# Patient Record
Sex: FEMALE | Race: BLACK OR AFRICAN AMERICAN | NOT HISPANIC OR LATINO | Employment: FULL TIME | URBAN - METROPOLITAN AREA
[De-identification: names, ages, dates, MRNs, and addresses within clinical notes are randomized per-mention and may not be internally consistent; named-entity substitution may affect disease eponyms.]

---

## 2022-07-21 VITALS
TEMPERATURE: 97.1 F | HEIGHT: 64 IN | DIASTOLIC BLOOD PRESSURE: 127 MMHG | SYSTOLIC BLOOD PRESSURE: 196 MMHG | HEART RATE: 93 BPM | BODY MASS INDEX: 45.41 KG/M2 | WEIGHT: 266 LBS | OXYGEN SATURATION: 100 % | RESPIRATION RATE: 18 BRPM

## 2022-07-21 PROCEDURE — 250N000009 HC RX 250: Performed by: STUDENT IN AN ORGANIZED HEALTH CARE EDUCATION/TRAINING PROGRAM

## 2022-07-21 PROCEDURE — 12011 RPR F/E/E/N/L/M 2.5 CM/<: CPT

## 2022-07-21 PROCEDURE — 99283 EMERGENCY DEPT VISIT LOW MDM: CPT

## 2022-07-21 PROCEDURE — 250N000011 HC RX IP 250 OP 636: Performed by: STUDENT IN AN ORGANIZED HEALTH CARE EDUCATION/TRAINING PROGRAM

## 2022-07-21 RX ADMIN — EPINEPHRINE BITARTRATE 3 ML: 1 POWDER at 23:55

## 2022-07-22 ENCOUNTER — HOSPITAL ENCOUNTER (EMERGENCY)
Facility: CLINIC | Age: 42
Discharge: HOME | End: 2022-07-22
Attending: EMERGENCY MEDICINE | Admitting: EMERGENCY MEDICINE
Payer: COMMERCIAL

## 2022-07-22 DIAGNOSIS — S01.81XA FACIAL LACERATION, INITIAL ENCOUNTER: ICD-10-CM

## 2022-07-22 NOTE — ED PROVIDER NOTES
EMERGENCY DEPARTMENT ENCOUnter      NAME: Chilo Sears  AGE: 42 year old female  YOB: 1980  MRN: [id number]  EVALUATION DATE & TIME: 2022 12:24 AM    PCP: No primary care provider on file.    ED PROVIDER: Pedro Harper DO      Chief Complaint   Patient presents with     Facial Laceration         FINAL IMPRESSION:  1. Facial laceration, initial encounter          ED COURSE & MEDICAL DECISION MAKIN:32 AM I met with the patient, obtained history, performed an initial exam, and discussed options and plan for diagnostics and treatment here in the ED.   12:40 AM Performed laceration repair procedure on patient.   12:53 AM I discussed the plan for discharge with the patient, and patient is agreeable. We discussed supportive cares at home and reasons for return to the ER including new or worsening symptoms - all questions and concerns addressed. Patient to be discharged by RN.    The patient presented emergency department tonight after suffering a minor facial laceration.  Her laceration was repaired with 3 stitches which she tolerated well.  Plan will be for discharge home with outpatient follow-up for suture removal in 5 days.  She is comfortable with this plan.    At the conclusion of the encounter I discussed the results of all of the tests and the disposition. The questions were answered. The patient or family acknowledged understanding and was agreeable with the care plan.           =================================================================    HPI    Per chart review, patient's last tetanus vaccination was on 09/10/2019.       Chilo Sears is a 42 year old female with a pertinent history of DM 2, HTN, anxiety, depression, who presents to this ED by walk in for evaluation of facial laceration. Patient reports she was attempting to put up a picture frame when a part of it slipped and struck her face just prior to ED arrival. She endorses a gaping laceration in her philtrum. She  "states ED nurses put LET on it ~30 minutes ago. Patient denies any additional complaints at this time.       REVIEW OF SYSTEMS     Constitutional:  Denies fever or chills  HENT:  Denies sore throat   Respiratory:  Denies cough or shortness of breath   Cardiovascular:  Denies chest pain or palpitations  GI:  Denies abdominal pain, nausea, or vomiting  Musculoskeletal:  Denies any new extremity pain   Skin:  Denies rash. Endorses wound.   Neurologic:  Denies headache, focal weakness or sensory changes    All other systems reviewed and are negative      PAST MEDICAL HISTORY:  History reviewed. No pertinent past medical history.    PAST SURGICAL HISTORY:  History reviewed. No pertinent surgical history.        CURRENT MEDICATIONS:    No current outpatient medications on file.      ALLERGIES:  No Known Allergies    FAMILY HISTORY:  History reviewed. No pertinent family history.    SOCIAL HISTORY:   Social History     Socioeconomic History     Marital status: Single     Spouse name: None     Number of children: None     Years of education: None     Highest education level: None       VITALS:  Patient Vitals for the past 24 hrs:   BP Temp Temp src Pulse Resp SpO2 Height Weight   07/21/22 2311 (!) 196/127 97.1  F (36.2  C) Temporal 93 18 100 % 1.626 m (5' 4\") 120.7 kg (266 lb)       PHYSICAL EXAM    Constitutional:  Well developed, Well nourished,  HENT:  Normocephalic, 1 centimeter horizontal laceration over philtrum. No active bleeding. No other sign of trauma or injury. , Bilateral external ears normal, Oropharynx moist, Nose normal.   Neck:  Normal range of motion, No meningismus, No stridor.   Eyes:  EOMI, Conjunctiva normal, No discharge.   Respiratory:  Normal breath sounds, No respiratory distress  Cardiovascular:  Normal heart rate  Musculoskeletal: No tenderness to palpation or major deformities noted.   Integument:  Warm, Dry, No erythema, No rash.   Neurologic:  Alert & oriented x 3, No focal deficits noted. "   Psychiatric:  Affect normal, Judgment normal, Mood normal.      PROCEDURES:     [facility]    -Laceration Repair    Date/Time: 7/22/2022 12:44 AM  Performed by: Pedro Harper MD  Authorized by: Pedro Harper MD     Risks, benefits and alternatives discussed.      ANESTHESIA (see MAR for exact dosages):     Anesthesia method:  Topical application    Topical anesthetic:  LET  LACERATION DETAILS     Location: Philtrum.    Length (cm):  1    REPAIR TYPE:     Repair type:  Simple      EXPLORATION:     Hemostasis achieved with:  LET    Wound exploration: wound explored through full range of motion and entire depth of wound probed and visualized      Contaminated: no      TREATMENT:     Area cleansed with:  Saline    Amount of cleaning:  Standard    Irrigation solution:  Sterile saline    SKIN REPAIR     Repair method:  Sutures    Suture size:  6-0    Suture material:  Prolene    Suture technique:  Simple interrupted    Number of sutures:  3    APPROXIMATION     Approximation:  Close    PROCEDURE    Patient Tolerance:  Patient tolerated the procedure well with no immediate complications          ICharly, am serving as a scribe to document services personally performed by Dr. Harper based on my observation and the provider's statements to me. IPedro, DO attest that Charly Reed is acting in a scribe capacity, has observed my performance of the services and has documented them in accordance with my direction.    Pedro Harper DO  Emergency Medicine  [facility] EMERGENCY ROOM  [address] [id number] [phone]  Dept: [phone]     Pedro Harper MD  07/22/22 0212

## 2022-07-22 NOTE — ED TRIAGE NOTES
Patient reports that she was changing pictures on her wall and one of the pictures hit her in the upper lip and she now has a laceration.  Christine Pond RN.......7/21/2022 11:14 PM     Triage Assessment     Row Name 07/21/22 7349       Triage Assessment (Adult)    Airway WDL WDL       Respiratory WDL    Respiratory WDL WDL       Skin Circulation/Temperature WDL    Skin Circulation/Temperature WDL WDL       Cardiac WDL    Cardiac WDL WDL       Peripheral/Neurovascular WDL    Peripheral Neurovascular WDL WDL       Cognitive/Neuro/Behavioral WDL    Cognitive/Neuro/Behavioral WDL WDL

## 2022-07-22 NOTE — DISCHARGE INSTRUCTIONS
You suffered a minor facial laceration today.  This was repaired with 3 stitches.  Follow-up with your primary care doctor or urgent care in 5 days for suture removal.  Return to the ER for any worsening symptoms or other concerns.

## 2024-10-29 ENCOUNTER — HOSPITAL ENCOUNTER (EMERGENCY)
Facility: CLINIC | Age: 44
Discharge: HOME | End: 2024-10-29
Attending: EMERGENCY MEDICINE | Admitting: EMERGENCY MEDICINE
Payer: COMMERCIAL

## 2024-10-29 VITALS
HEART RATE: 76 BPM | DIASTOLIC BLOOD PRESSURE: 101 MMHG | OXYGEN SATURATION: 98 % | WEIGHT: 271 LBS | RESPIRATION RATE: 16 BRPM | TEMPERATURE: 97.5 F | BODY MASS INDEX: 46.26 KG/M2 | SYSTOLIC BLOOD PRESSURE: 172 MMHG | HEIGHT: 64 IN

## 2024-10-29 DIAGNOSIS — M25.562 PAIN IN BOTH KNEES, UNSPECIFIED CHRONICITY: ICD-10-CM

## 2024-10-29 DIAGNOSIS — R10.11 RIGHT UPPER QUADRANT PAIN: ICD-10-CM

## 2024-10-29 DIAGNOSIS — M25.561 PAIN IN BOTH KNEES, UNSPECIFIED CHRONICITY: ICD-10-CM

## 2024-10-29 LAB
ALBUMIN SERPL BCG-MCNC: 4.2 G/DL (ref 3.5–5.2)
ALP SERPL-CCNC: 77 U/L (ref 40–150)
ALT SERPL W P-5'-P-CCNC: 11 U/L (ref 0–50)
ANION GAP SERPL CALCULATED.3IONS-SCNC: 11 MMOL/L (ref 7–15)
AST SERPL W P-5'-P-CCNC: 15 U/L (ref 0–45)
BILIRUB DIRECT SERPL-MCNC: <0.2 MG/DL (ref 0–0.3)
BILIRUB SERPL-MCNC: 0.8 MG/DL
BUN SERPL-MCNC: 11.4 MG/DL (ref 6–20)
CALCIUM SERPL-MCNC: 8.7 MG/DL (ref 8.8–10.4)
CHLORIDE SERPL-SCNC: 102 MMOL/L (ref 98–107)
CREAT SERPL-MCNC: 0.83 MG/DL (ref 0.51–0.95)
EGFRCR SERPLBLD CKD-EPI 2021: 89 ML/MIN/1.73M2
ERYTHROCYTE [DISTWIDTH] IN BLOOD BY AUTOMATED COUNT: 15.5 % (ref 10–15)
GLUCOSE SERPL-MCNC: 124 MG/DL (ref 70–99)
HCG SERPL QL: NEGATIVE
HCO3 SERPL-SCNC: 23 MMOL/L (ref 22–29)
HCT VFR BLD AUTO: 33.9 % (ref 35–47)
HGB BLD-MCNC: 11 G/DL (ref 11.7–15.7)
LIPASE SERPL-CCNC: 15 U/L (ref 13–60)
MAGNESIUM SERPL-MCNC: 1.9 MG/DL (ref 1.7–2.3)
MCH RBC QN AUTO: 25.8 PG (ref 26.5–33)
MCHC RBC AUTO-ENTMCNC: 32.4 G/DL (ref 31.5–36.5)
MCV RBC AUTO: 80 FL (ref 78–100)
PLATELET # BLD AUTO: 365 10E3/UL (ref 150–450)
POTASSIUM SERPL-SCNC: 3.9 MMOL/L (ref 3.4–5.3)
PROT SERPL-MCNC: 7.8 G/DL (ref 6.4–8.3)
RBC # BLD AUTO: 4.26 10E6/UL (ref 3.8–5.2)
SODIUM SERPL-SCNC: 136 MMOL/L (ref 135–145)
WBC # BLD AUTO: 6.6 10E3/UL (ref 4–11)

## 2024-10-29 PROCEDURE — 96374 THER/PROPH/DIAG INJ IV PUSH: CPT

## 2024-10-29 PROCEDURE — 36415 COLL VENOUS BLD VENIPUNCTURE: CPT

## 2024-10-29 PROCEDURE — 82040 ASSAY OF SERUM ALBUMIN: CPT

## 2024-10-29 PROCEDURE — 82310 ASSAY OF CALCIUM: CPT

## 2024-10-29 PROCEDURE — 99284 EMERGENCY DEPT VISIT MOD MDM: CPT | Mod: 25

## 2024-10-29 PROCEDURE — 84295 ASSAY OF SERUM SODIUM: CPT

## 2024-10-29 PROCEDURE — 84703 CHORIONIC GONADOTROPIN ASSAY: CPT

## 2024-10-29 PROCEDURE — 84155 ASSAY OF PROTEIN SERUM: CPT

## 2024-10-29 PROCEDURE — 85027 COMPLETE CBC AUTOMATED: CPT

## 2024-10-29 PROCEDURE — 83735 ASSAY OF MAGNESIUM: CPT

## 2024-10-29 PROCEDURE — 83690 ASSAY OF LIPASE: CPT

## 2024-10-29 PROCEDURE — 85048 AUTOMATED LEUKOCYTE COUNT: CPT

## 2024-10-29 PROCEDURE — 250N000011 HC RX IP 250 OP 636

## 2024-10-29 RX ORDER — KETOROLAC TROMETHAMINE 15 MG/ML
15 INJECTION, SOLUTION INTRAMUSCULAR; INTRAVENOUS ONCE
Status: COMPLETED | OUTPATIENT
Start: 2024-10-29 | End: 2024-10-29

## 2024-10-29 RX ORDER — NAPROXEN 500 MG/1
500 TABLET ORAL 2 TIMES DAILY WITH MEALS
Qty: 10 TABLET | Refills: 0 | Status: SHIPPED | OUTPATIENT
Start: 2024-10-29 | End: 2024-11-03

## 2024-10-29 RX ADMIN — KETOROLAC TROMETHAMINE 15 MG: 15 INJECTION, SOLUTION INTRAMUSCULAR; INTRAVENOUS at 07:52

## 2024-10-29 ASSESSMENT — ACTIVITIES OF DAILY LIVING (ADL)
ADLS_ACUITY_SCORE: 0

## 2024-10-29 ASSESSMENT — COLUMBIA-SUICIDE SEVERITY RATING SCALE - C-SSRS
1. IN THE PAST MONTH, HAVE YOU WISHED YOU WERE DEAD OR WISHED YOU COULD GO TO SLEEP AND NOT WAKE UP?: NO
2. HAVE YOU ACTUALLY HAD ANY THOUGHTS OF KILLING YOURSELF IN THE PAST MONTH?: NO
6. HAVE YOU EVER DONE ANYTHING, STARTED TO DO ANYTHING, OR PREPARED TO DO ANYTHING TO END YOUR LIFE?: NO

## 2024-10-29 NOTE — ED TRIAGE NOTES
Patient presents to the ED complaining of RUQ abdominal pain for the last 5-6 weeks and bilateral knee pain for the past three weeks.  No medication prior to arrival.       Triage Assessment (Adult)       Row Name 10/29/24 0654          Triage Assessment    Airway WDL WDL        Respiratory WDL    Respiratory WDL WDL        Skin Circulation/Temperature WDL    Skin Circulation/Temperature WDL WDL        Cardiac WDL    Cardiac WDL WDL        Peripheral/Neurovascular WDL    Peripheral Neurovascular WDL WDL        Cognitive/Neuro/Behavioral WDL    Cognitive/Neuro/Behavioral WDL WDL

## 2024-10-29 NOTE — ED PROVIDER NOTES
Emergency Department Encounter   NAME: Chilo Sears ; AGE: 44 year old female ; YOB: 1980 ; MRN: [id number] ; PCP: No Ref-Primary, Physician   ED PROVIDER: Nathalie Crowley PA-C    Evaluation Date & Time:   10/29/2024  6:57 AM    CHIEF COMPLAINT:  Abdominal Pain (RUQ and bilateral knee pain)      Impression and Plan   MDM: Chilo Sears is a 44 year old female who presents to the ED for evaluation of RUQ pain and knee pain. Appears well on initial exam with vitals significant for /101.   Physical exam remarkable for medial and lateral joint line tenderness in knees bilaterally.      Side/RUQ Pain:   Differential diagnosis for RUQ pain includes nephrolithiasis, MSK pain, PE, gallstones, atypical ACS.   UA is normal without blood so low suspicion for kidney stone and no further imaging is pursued.  Low suspicion for PE as side pain has been going on for multiple weeks, patient is not short of breath, not hypoxic, not tachycardic.Patient does PERC out.   CBC does show mild anemia at 11.  Lipase, Mg, BMP unremarkable.   We did discuss that her pain could be of gallbladder etiology although patient has had her gallbladder removed so there is lower suspicion for this.  Additionally, when LFTs are normal, so obstructed biliary and hepatic duct is less likely.  No further imaging pursued with unremarkable hepatic panel.    Knee Pain:   Differential includes joint effusion, arthritis, gout, septic joint, polyarthralgia attributed to autoimmune disorder. Patient is tender  to both medial and lateral joint lines with mild erythema bilaterally. Normal ROM and strength. Low suspicion for gout or septic joint as symptoms are equal in both knees, no erythema, normal ROM and joint strength. Normal WBCs and absence of fever.   With isolated symptoms to the knees and fairly acute over the last 3 weeks or so, presentation is most suspicious for MSK etiology or arthritis etiology. We discussed taking Naproxen  over the next 5 days for symptomatic control with close follow up with PCP. We did consider imaging such as xray to evaluate for any effusion or fracture- however, I have low suspicion for either as there is no history of trauma or injury as well as symptoms being equal in both knees without warmth or erythema. For these reasons no further workup is pursued here in the emergency department.      Disposition:   Patient should follow-up with her primary care provider to discuss her side pain and knee pain.   I did prescribe her naproxen for the next 5 days for both her knee pain and side pain.  Also discussed her high blood pressure here in the emergency department.  It does remain elevated at about 172/101 throughout ED course.  Patient states she does have a history of high blood pressure during pregnancy but no other history of high blood pressure.  Patient denies any chest pain or shortness of breath and thus further workup of hypertension is not pursued here in the emergency department.  Patient understands to follow-up with her primary care provider for elevated BP, knee pain, side pain.     Return precautions given including worsening severe pain despite naproxen use, chest pain, shortness of breath associated.     Medical Decision Making  Obtained supplemental history:Supplemental history obtained?: No  Reviewed external records: External records reviewed?: No  Care impacted by chronic illness:Documented in Chart  Did you consider but not order tests?: Work up considered but not performed and documented in chart, if applicable  Did you interpret images independently?: Independent interpretation of ECG and images noted in documentation, when applicable.  Consultation discussion with other provider:Did you involve another provider (consultant, MH, pharmacy, etc.)?: I discussed the care with another health care provider, see documentation for details.  Discharge. No recommendations on prescription strength  medication(s). See documentation for any additional details.    MIPS: Not Applicable      ED COURSE:  6:57 AM I met and introduced myself to the patient. I gathered initial history and performed my physical exam. We discussed plan for initial workup.  I have staffed the patient with Dr. Dyer, ED MD, who has evaluated the patient and agrees with all aspects of today's care.    I rechecked the patient and discussed results, discharge, follow up, and reasons to return to the ED.     At the conclusion of the encounter I discussed the results of all the tests and the disposition. The questions were answered. The patient or family acknowledged understanding and was agreeable with the care plan.    FINAL IMPRESSION:    ICD-10-CM    1. Right upper quadrant pain  R10.11       2. Pain in both knees, unspecified chronicity  M25.561     M25.562         MEDICATIONS GIVEN IN THE EMERGENCY DEPARTMENT:  Medications   ketorolac (TORADOL) injection 15 mg (15 mg Intravenous $Given 10/29/24 0752)     NEW PRESCRIPTIONS STARTED AT TODAY'S ED VISIT:  Discharge Medication List as of 10/29/2024  9:03 AM        START taking these medications    Details   naproxen (NAPROSYN) 500 MG tablet Take 1 tablet (500 mg) by mouth 2 times daily (with meals) for 5 days., Disp-10 tablet, R-0, E-Prescribe               HPI   Use of Intrepreter: N/A     Chilo Sears is a 44 year old female with a pertinent history of cholecystectomy who presents to the ED by self for evaluation of right upper quadrant/side pain and bilateral knee pain.     RUQ pain:   This pain has been going on intermittently for about 5 to 6 weeks.  She notes that usually occurs after she gets home from work and drinks her soda.  Following this, she gets intense right upper quadrant pain that lasts a few minutes up to an hour.  She states that she has to lay on her side until the pain goes away.  Nothing else improves the pain.   She had her gallbladder removed and feels this is  "similar to this pain.  Patient has not noticed her pain being related to food in any way, she just feels it is related to the sodas that she drinks.  She denies any other symptoms associated with the pain such as chest pain or shortness of breath or nausea or vomiting.  She does not have any abdominal pain anywhere else aside from the right upper quadrant.  She denies any current symptoms.     Knee pain:   Knee pain has been going on 2 to 3 weeks.  She has 3 jobs and she is fairly active and one of the jobs, going up a lot of stairs. Feels the knee pain is worse after she gets home from work and she also has significant swelling associated in both knees bilaterally.  The left knee is slightly worse than the right.  She also states that although she has noticed pain after her job where she is fairly active, she has another job where she sits all day, and feels the knee pain is just as severe following a day at work with the job where she is sitting.  She has not tried anything at home.  She has no history of arthritis.  No history of trauma or injury to the knees.      Medical History     No past medical history on file.    No past surgical history on file.    No family history on file.         naproxen (NAPROSYN) 500 MG tablet      Physical Exam     First Vitals:  Patient Vitals for the past 24 hrs:   BP Temp Temp src Pulse Resp SpO2 Height Weight   10/29/24 0904 -- -- -- 76 -- 98 % -- --   10/29/24 0903 -- -- -- 80 -- 98 % -- --   10/29/24 0833 -- -- -- 76 -- 98 % -- --   10/29/24 0800 (!) 172/101 -- -- 81 -- 96 % -- --   10/29/24 0759 (!) 174/102 -- -- 76 -- 99 % -- --   10/29/24 0701 -- -- -- -- -- -- -- 122.9 kg (271 lb)   10/29/24 0653 (!) 176/101 97.5  F (36.4  C) Temporal 90 16 96 % 1.626 m (5' 4\") --     PHYSICAL EXAM:   Physical Exam    Constitutional: alert,  no acute distress, not ill-appearing  Mouth/Throat: moist, clear, no erythema or exudate   Eyes: EOM intact  Neck: ROM normal  Cardio: regular rate, " regular rhythm, no murmurs   Pulmonary: effort normal, breath sounds normal, no wheezing or rales   Abdominal: flat, soft, no tenderness or guarding to palpation of RUQ or abdomen diffusely, no CVA tenderness   MSK: R and L knee   - tender to palpation of medial and lateral joint lines   - no redness or obvious effusion, mild swelling to knee joints bilaterally   - normal ROM bilaterally, normal strength   - 2+ DP pulses bilaterally   Skin: no lesions, rashes, or erythema  Neuro: no focal deficit, at baseline, no cranial nerve weakness, no sensation deficit, no weakness  Psychiatric: mood and behavior within normal limit    Results     LAB:  All pertinent labs reviewed and interpreted  Labs Ordered and Resulted from Time of ED Arrival to Time of ED Departure   CBC WITH PLATELETS - Abnormal       Result Value    WBC Count 6.6      RBC Count 4.26      Hemoglobin 11.0 (*)     Hematocrit 33.9 (*)     MCV 80      MCH 25.8 (*)     MCHC 32.4      RDW 15.5 (*)     Platelet Count 365     BASIC METABOLIC PANEL - Abnormal    Sodium 136      Potassium 3.9      Chloride 102      Carbon Dioxide (CO2) 23      Anion Gap 11      Urea Nitrogen 11.4      Creatinine 0.83      GFR Estimate 89      Calcium 8.7 (*)     Glucose 124 (*)    HEPATIC FUNCTION PANEL - Normal    Protein Total 7.8      Albumin 4.2      Bilirubin Total 0.8      Alkaline Phosphatase 77      AST 15      ALT 11      Bilirubin Direct <0.20     LIPASE - Normal    Lipase 15     MAGNESIUM - Normal    Magnesium 1.9     HCG QUALITATIVE PREGNANCY - Normal    hCG Serum Qualitative Negative          RADIOLOGY:  No orders to display     PROCEDURES:  None     Nathalie Crowley PA-C   Emergency Medicine   [facility] EMERGENCY ROOM       Nathalie Crowley PA-C  10/29/24 6301

## 2024-10-29 NOTE — ED PROVIDER NOTES
Emergency Department Midlevel Supervisory Note     I had a face to face encounter with this patient seen by the Advanced Practice Provider (CARMINA). I personally made/approved the management plan and take responsibility for the patient management. I personally saw patient and performed a substantive portion of the visit including all aspects of the medical decision making.     ED Course:  7:08 AM  Nathalie Crowley PA-C  staffed patient with me. I agree with their assessment and plan of management, and I will see the patient.  7:29 AM  I met with the patient to introduce myself, gather additional history, perform my initial exam, and discuss the plan.   8:24 AM Labs overall reassuring, mild anemia and hyperglycemia.  Do not feel CT or US imaging of abdomen indicated at this point based on exam/history/labs.  Pt also hypertensive during ED stay.  Will need outpatient primary care clinic follow up, which is what she tried to schedule yesterday.  Plan for home with naproxen 500mg BID, outpatient follow up.    Brief HPI:     Chilo Sears is a 44 year old female who presents for evaluation of abdominal pain and knee pain.    The patient presents with 5-6 weeks of sharp RUQ pain that is present ~1x/day. It normally comes after she finishes her soda when she gets home from work and lasts between a few minutes and 1 hour. She does not eat while at work. She has had a cholecystectomy. The patient does not take pain medications for it.     She also complains of 3 weeks of bilateral knee pain. The patient works 3 jobs and says that 1 of them requires her to do a lot of standing/moving. However she continues to have pain at another one of her jobs with less movement. She denies chest pain, shortness of breath, back pain, urinary symptoms, nausea, vomiting, or any other complaints at this time.     I, Yousif Neal, am serving as a scribe to document services personally performed by Dr. Pedro Dyer, based on my observations  "and the provider's statements to me. I, Dr. Pedro Dyer, attest that Yousif Neal is acting in a scribe capacity, has observed my performance of the services and has documented them in accordance with my direction.     Brief Physical Exam: BP (!) 172/101   Pulse 76   Temp 97.5  F (36.4  C) (Temporal)   Resp 16   Ht 1.626 m (5' 4\")   Wt 122.9 kg (271 lb)   SpO2 98%   BMI 46.52 kg/m    Physical Exam  Vitals and nursing note reviewed.   Constitutional:       General: She is not in acute distress.     Appearance: Normal appearance.   HENT:      Head: Normocephalic and atraumatic.      Nose: Nose normal.      Mouth/Throat:      Mouth: Mucous membranes are moist.   Eyes:      Pupils: Pupils are equal, round, and reactive to light.   Cardiovascular:      Rate and Rhythm: Normal rate and regular rhythm.      Pulses: Normal pulses.           Radial pulses are 2+ on the right side and 2+ on the left side.        Dorsalis pedis pulses are 2+ on the right side and 2+ on the left side.   Pulmonary:      Effort: Pulmonary effort is normal. No respiratory distress.      Breath sounds: Normal breath sounds.   Abdominal:      Palpations: Abdomen is soft.      Tenderness: There is no abdominal tenderness.      Comments: Minimal RUQ tenderness. Obese. No rash on abdomen.   Musculoskeletal:      Cervical back: Full passive range of motion without pain and neck supple.      Right knee: No effusion.      Left knee: No effusion.      Comments: Bilateral knees have no effusion or edema. No calf tenderness or swelling b/l   Skin:     General: Skin is warm.      Findings: No rash.   Neurological:      General: No focal deficit present.      Mental Status: She is alert. Mental status is at baseline.      Comments: Fluent speech, no acute lateralizing deficits   Psychiatric:         Mood and Affect: Mood normal.         Behavior: Behavior normal.            MDM:  Pt seen in conjunction with CARMINA Nathalie Crowley.  Pt here with 2 " primary complaints after speaking with nurse triage line yesterday and being told to come to ED.  Pt states she was trying to make a primary care appt, but was scared into coming into ED. She works multiple jobs, on feet frequently and doesn't typically eat while at work. Reports intermittent, focal RUQ pain for past 6 weeks, primarily when she returns home from work and eats/drinks. Pain lasts maybe an hour and no n/v/d.  No cp/sob or cough.  Pt has no leg pain/swelling or signs of DVT or obvious risk factors for PE to warrant CTA chest or US of legs.  Pt also reporting b/l knee pain, worse when she's up and about.  Exam overall very reassuring, including benign knees with no signs of effusion or septic arthritis. Discussed with her naproxen 500mg BID for this, will give IV toradol here now as we're checking labs regarding her RUQ abd pain. Do not feel emergent CT or US abd imaging warranted.  Anticipate discharge pending workup.  Pt advised on continued outpatient follow up as she was already trying to do.         1. Right upper quadrant pain    2. Pain in both knees, unspecified chronicity          Labs and Imaging:  Results for orders placed or performed during the hospital encounter of 10/29/24   CBC (+ platelets, no diff)   Result Value Ref Range    WBC Count 6.6 4.0 - 11.0 10e3/uL    RBC Count 4.26 3.80 - 5.20 10e6/uL    Hemoglobin 11.0 (L) 11.7 - 15.7 g/dL    Hematocrit 33.9 (L) 35.0 - 47.0 %    MCV 80 78 - 100 fL    MCH 25.8 (L) 26.5 - 33.0 pg    MCHC 32.4 31.5 - 36.5 g/dL    RDW 15.5 (H) 10.0 - 15.0 %    Platelet Count 365 150 - 450 10e3/uL   Hepatic panel   Result Value Ref Range    Protein Total 7.8 6.4 - 8.3 g/dL    Albumin 4.2 3.5 - 5.2 g/dL    Bilirubin Total 0.8 <=1.2 mg/dL    Alkaline Phosphatase 77 40 - 150 U/L    AST 15 0 - 45 U/L    ALT 11 0 - 50 U/L    Bilirubin Direct <0.20 0.00 - 0.30 mg/dL   Result Value Ref Range    Lipase 15 13 - 60 U/L   Result Value Ref Range    Magnesium 1.9 1.7 - 2.3  mg/dL   Basic metabolic panel   Result Value Ref Range    Sodium 136 135 - 145 mmol/L    Potassium 3.9 3.4 - 5.3 mmol/L    Chloride 102 98 - 107 mmol/L    Carbon Dioxide (CO2) 23 22 - 29 mmol/L    Anion Gap 11 7 - 15 mmol/L    Urea Nitrogen 11.4 6.0 - 20.0 mg/dL    Creatinine 0.83 0.51 - 0.95 mg/dL    GFR Estimate 89 >60 mL/min/1.73m2    Calcium 8.7 (L) 8.8 - 10.4 mg/dL    Glucose 124 (H) 70 - 99 mg/dL   HCG QUALitative pregnancy (blood)   Result Value Ref Range    hCG Serum Qualitative Negative Negative       I have reviewed the relevant laboratory studies above.    I independently interpreted the following imaging study(s):   none    EKG: I reviewed and independently interpreted the patient's EKG, with comments made as listed below. Please see scanned EKG for full report.   none    Procedures:  I was present for the key portions of procedures documented in CARMINA/midlevel note, see midlevel note for further details.    Pedro Dyer DO  [facility] EMERGENCY ROOM  [address] [id number] [phone]     Pedro Dyer MD  10/29/24 1124

## 2024-10-29 NOTE — DISCHARGE INSTRUCTIONS
As we discussed today, there are no identified acute causes of your right upper abdominal pain today.Your lab work shows a mild anemia of hemoglobin 11 but is otherwise normal.    In terms of your side pain, return to the emergency department if you develop chest pain or shortness of breath associated with it.    For your knee pain, take naproxen as prescribed for the next 5 days. You can start this this evening. Return to the emergency department if the pain becomes so severe that you cannot walk, or is worsening despite naproxen use.  As we discussed, naproxen is in the same family as ibuprofen, so do not take these at the same time.    Follow-up with your primary care doctor for your side pain, knee pain, and high blood pressure that we discussed today.    Return for any other emergent concerns for reevaluation.

## 2024-11-03 ENCOUNTER — APPOINTMENT (OUTPATIENT)
Dept: ULTRASOUND IMAGING | Facility: CLINIC | Age: 44
End: 2024-11-03
Attending: EMERGENCY MEDICINE
Payer: COMMERCIAL

## 2024-11-03 ENCOUNTER — APPOINTMENT (OUTPATIENT)
Dept: RADIOLOGY | Facility: CLINIC | Age: 44
End: 2024-11-03
Attending: EMERGENCY MEDICINE
Payer: COMMERCIAL

## 2024-11-03 ENCOUNTER — HOSPITAL ENCOUNTER (EMERGENCY)
Facility: CLINIC | Age: 44
Discharge: HOME | End: 2024-11-03
Attending: EMERGENCY MEDICINE | Admitting: EMERGENCY MEDICINE
Payer: COMMERCIAL

## 2024-11-03 VITALS
RESPIRATION RATE: 20 BRPM | OXYGEN SATURATION: 99 % | TEMPERATURE: 97.1 F | HEART RATE: 87 BPM | SYSTOLIC BLOOD PRESSURE: 164 MMHG | DIASTOLIC BLOOD PRESSURE: 81 MMHG

## 2024-11-03 DIAGNOSIS — M25.462 EFFUSION OF BOTH KNEE JOINTS: ICD-10-CM

## 2024-11-03 DIAGNOSIS — M25.562 CHRONIC PAIN OF BOTH KNEES: ICD-10-CM

## 2024-11-03 DIAGNOSIS — R10.11 RUQ ABDOMINAL PAIN: ICD-10-CM

## 2024-11-03 DIAGNOSIS — M25.461 EFFUSION OF BOTH KNEE JOINTS: ICD-10-CM

## 2024-11-03 DIAGNOSIS — M25.561 CHRONIC PAIN OF BOTH KNEES: ICD-10-CM

## 2024-11-03 DIAGNOSIS — G89.29 CHRONIC PAIN OF BOTH KNEES: ICD-10-CM

## 2024-11-03 DIAGNOSIS — K76.0 HEPATIC STEATOSIS: ICD-10-CM

## 2024-11-03 LAB
ACANTHOCYTES BLD QL SMEAR: SLIGHT
ALBUMIN SERPL BCG-MCNC: 3.9 G/DL (ref 3.5–5.2)
ALP SERPL-CCNC: 81 U/L (ref 40–150)
ALT SERPL W P-5'-P-CCNC: 12 U/L (ref 0–50)
ANION GAP SERPL CALCULATED.3IONS-SCNC: 13 MMOL/L (ref 7–15)
AST SERPL W P-5'-P-CCNC: 21 U/L (ref 0–45)
BASOPHILS # BLD AUTO: 0 10E3/UL (ref 0–0.2)
BASOPHILS NFR BLD AUTO: 1 %
BILIRUB SERPL-MCNC: 0.5 MG/DL
BUN SERPL-MCNC: 12.8 MG/DL (ref 6–20)
CALCIUM SERPL-MCNC: 8.8 MG/DL (ref 8.8–10.4)
CHLORIDE SERPL-SCNC: 103 MMOL/L (ref 98–107)
CREAT SERPL-MCNC: 0.79 MG/DL (ref 0.51–0.95)
EGFRCR SERPLBLD CKD-EPI 2021: >90 ML/MIN/1.73M2
ELLIPTOCYTES BLD QL SMEAR: SLIGHT
EOSINOPHIL # BLD AUTO: 0.2 10E3/UL (ref 0–0.7)
EOSINOPHIL NFR BLD AUTO: 2 %
ERYTHROCYTE [DISTWIDTH] IN BLOOD BY AUTOMATED COUNT: 15.4 % (ref 10–15)
GLUCOSE SERPL-MCNC: 123 MG/DL (ref 70–99)
HCO3 SERPL-SCNC: 20 MMOL/L (ref 22–29)
HCT VFR BLD AUTO: 32.1 % (ref 35–47)
HGB BLD-MCNC: 10.4 G/DL (ref 11.7–15.7)
IMM GRANULOCYTES # BLD: 0 10E3/UL
IMM GRANULOCYTES NFR BLD: 0 %
LIPASE SERPL-CCNC: 18 U/L (ref 13–60)
LYMPHOCYTES # BLD AUTO: 3.3 10E3/UL (ref 0.8–5.3)
LYMPHOCYTES NFR BLD AUTO: 49 %
MCH RBC QN AUTO: 25.9 PG (ref 26.5–33)
MCHC RBC AUTO-ENTMCNC: 32.4 G/DL (ref 31.5–36.5)
MCV RBC AUTO: 80 FL (ref 78–100)
MONOCYTES # BLD AUTO: 0.4 10E3/UL (ref 0–1.3)
MONOCYTES NFR BLD AUTO: 6 %
NEUTROPHILS # BLD AUTO: 2.8 10E3/UL (ref 1.6–8.3)
NEUTROPHILS NFR BLD AUTO: 42 %
NRBC # BLD AUTO: 0 10E3/UL
NRBC BLD AUTO-RTO: 0 /100
PLAT MORPH BLD: ABNORMAL
PLATELET # BLD AUTO: 294 10E3/UL (ref 150–450)
POTASSIUM SERPL-SCNC: 4.3 MMOL/L (ref 3.4–5.3)
PROT SERPL-MCNC: 7.3 G/DL (ref 6.4–8.3)
RBC # BLD AUTO: 4.01 10E6/UL (ref 3.8–5.2)
RBC MORPH BLD: ABNORMAL
SODIUM SERPL-SCNC: 136 MMOL/L (ref 135–145)
WBC # BLD AUTO: 6.6 10E3/UL (ref 4–11)

## 2024-11-03 PROCEDURE — 76705 ECHO EXAM OF ABDOMEN: CPT

## 2024-11-03 PROCEDURE — 99285 EMERGENCY DEPT VISIT HI MDM: CPT | Mod: 25

## 2024-11-03 PROCEDURE — 73560 X-RAY EXAM OF KNEE 1 OR 2: CPT | Mod: 50

## 2024-11-03 PROCEDURE — 84155 ASSAY OF PROTEIN SERUM: CPT | Performed by: EMERGENCY MEDICINE

## 2024-11-03 PROCEDURE — 250N000013 HC RX MED GY IP 250 OP 250 PS 637: Performed by: EMERGENCY MEDICINE

## 2024-11-03 PROCEDURE — 250N000011 HC RX IP 250 OP 636: Performed by: EMERGENCY MEDICINE

## 2024-11-03 PROCEDURE — 83690 ASSAY OF LIPASE: CPT | Performed by: EMERGENCY MEDICINE

## 2024-11-03 PROCEDURE — 85004 AUTOMATED DIFF WBC COUNT: CPT | Performed by: EMERGENCY MEDICINE

## 2024-11-03 PROCEDURE — 96374 THER/PROPH/DIAG INJ IV PUSH: CPT

## 2024-11-03 PROCEDURE — 84460 ALANINE AMINO (ALT) (SGPT): CPT | Performed by: EMERGENCY MEDICINE

## 2024-11-03 PROCEDURE — 36415 COLL VENOUS BLD VENIPUNCTURE: CPT | Performed by: EMERGENCY MEDICINE

## 2024-11-03 RX ORDER — MAGNESIUM HYDROXIDE/ALUMINUM HYDROXICE/SIMETHICONE 120; 1200; 1200 MG/30ML; MG/30ML; MG/30ML
15 SUSPENSION ORAL ONCE
Status: COMPLETED | OUTPATIENT
Start: 2024-11-03 | End: 2024-11-03

## 2024-11-03 RX ORDER — DICYCLOMINE HCL 20 MG
20 TABLET ORAL 2 TIMES DAILY
Qty: 20 TABLET | Refills: 0 | Status: SHIPPED | OUTPATIENT
Start: 2024-11-03 | End: 2024-11-13

## 2024-11-03 RX ADMIN — FAMOTIDINE 20 MG: 10 INJECTION, SOLUTION INTRAVENOUS at 07:16

## 2024-11-03 RX ADMIN — DICLOFENAC 2 G: 10 GEL TOPICAL at 08:11

## 2024-11-03 RX ADMIN — ALUMINUM HYDROXIDE, MAGNESIUM HYDROXIDE, AND SIMETHICONE 15 ML: 1200; 120; 1200 SUSPENSION ORAL at 07:16

## 2024-11-03 ASSESSMENT — COLUMBIA-SUICIDE SEVERITY RATING SCALE - C-SSRS
2. HAVE YOU ACTUALLY HAD ANY THOUGHTS OF KILLING YOURSELF IN THE PAST MONTH?: NO
6. HAVE YOU EVER DONE ANYTHING, STARTED TO DO ANYTHING, OR PREPARED TO DO ANYTHING TO END YOUR LIFE?: NO
1. IN THE PAST MONTH, HAVE YOU WISHED YOU WERE DEAD OR WISHED YOU COULD GO TO SLEEP AND NOT WAKE UP?: NO

## 2024-11-03 ASSESSMENT — ACTIVITIES OF DAILY LIVING (ADL)
ADLS_ACUITY_SCORE: 0

## 2024-11-03 ASSESSMENT — ENCOUNTER SYMPTOMS
ABDOMINAL PAIN: 1
VOMITING: 0
NAUSEA: 0

## 2024-11-03 NOTE — ED PROVIDER NOTES
EMERGENCY DEPARTMENT ENCOUNTER       ED Course & Medical Decision Making   6:17 AM I met patient and performed my initial exam     I saw and examined the patient.  Her symptoms are quite chronic but do still warrant evaluation.  She has expressed that she has had difficulty navigating the medical field and she is appreciative to have help for these issues.  The ultrasound shows some fatty liver and certainly her right-sided pain is in that area and could reflect some capsular pain.    Bilateral knee x-rays show some signs of arthritis and mild effusion.  I suspect that her weight is contributing to this.  We discussed having her have a visit with her primary care physician to see if there is any pharmacological aids to help with her weight loss.  In the meantime I will give her some Voltaren, Ace wrap's and referral to orthopedics for her knees.  Her labs are okay and I feel she can go home but I have referred her to gastroenterology concerning her fatty liver and right upper quadrant pain.    Medical Decision Making  Obtained supplemental history:Supplemental history obtained?: Documented in chart  Reviewed external records: External records reviewed?: Documented in chart and Inpatient Record: Patient was seen at [facility] ED on 10/29/24  Care impacted by chronic illness:Documented in Chart  Care significantly affected by social determinants of health:N/A  Did you consider but not order tests?: Work up considered but not performed and documented in chart, if applicable  Did you interpret images independently?: Independent interpretation of ECG and images noted in documentation, when applicable.  Consultation discussion with other provider:Did you involve another provider (consultant, , pharmacy, etc.)?: No  Discharge. I prescribed additional prescription strength medication(s) as charted. See documentation for any additional details.             Prior to making a final disposition on this patient the results  "of patient's tests and other diagnostic studies were discussed with the patient. All questions were answered. Patient expressed understanding of the plan and was amenable to it.    Medications   famotidine (PEPCID) injection 20 mg (20 mg Intravenous $Given 11/3/24 0716)   alum & mag hydroxide-simethicone (MAALOX) suspension 15 mL (15 mLs Oral $Given 11/3/24 0716)   diclofenac (VOLTAREN) 1 % topical gel 2 g (2 g Topical $Given 11/3/24 0811)       Final Impression     1. Chronic pain of both knees    2. Effusion of both knee joints    3. RUQ abdominal pain    4. Hepatic steatosis            Chief Complaint     Chief Complaint   Patient presents with    Abdominal Pain       RUQ abd pain x2 months, stinging pain that woke her up this morning, worse with movement. Has had her gallbladder out previously, no other abd surgeries. No NVD, no blood in urine/vomit/stool.    No meds PTA; ABCs intact   Triage Assessment (Adult)       Row Name 11/03/24 0605          Triage Assessment    Airway WDL WDL        Respiratory WDL    Respiratory WDL WDL        Skin Circulation/Temperature WDL    Skin Circulation/Temperature WDL WDL        Cardiac WDL    Cardiac WDL WDL        Peripheral/Neurovascular WDL    Peripheral Neurovascular WDL WDL        Cognitive/Neuro/Behavioral WDL    Cognitive/Neuro/Behavioral WDL WDL                         HPI       Chilo Sears is a pleasant 44 year old female who presents to the ED for evaluation of RUQ abdominal pain and bilateral knee pain.     The patient reports right upper quadrant abdominal pain along with chronic pain in the bilateral knees that has been ongoing for two months now. She reports \"finally making appointment\" for her frequent pain and was told to come into the ED. Patient states she can feel like pain during the day, \"but not all the time\". Nothing makes the pain worse. She describes the pain as a \"stinging\" pain.     During her last ED visit, patient was given naproxen and was " "told that \"nothing was wrong\" with her.  Patient had her gallbladder removed a long time ago. She also as a nexplanon in that is now . Patient has scheduled an appointment to have that removed. She does not take any daily prescription medication. She denies any nausea or vomiting. No other complaints at this time.       I, Evon Evon am serving as a scribe to document services personally performed by Romel Duque M.D. based on my observation and the provider's statements to me. I, Romel Duque M.D attest that Let Evon is acting in a scribe capacity, has observed my performance of the services and has documented them in accordance with my direction.    Past Medical History     History reviewed. No pertinent past medical history.  No past surgical history on file.  No family history on file.        Relevant past medical, surgical, family and social history as documented above, has been reviewed and discussed with patient. No changes or additions, unless otherwise noted in the HPI.    Current Medications     diclofenac (VOLTAREN) 1 % topical gel  dicyclomine (BENTYL) 20 MG tablet  naproxen (NAPROSYN) 500 MG tablet        Allergies     No Known Allergies    Review of Systems     Review of Systems   Gastrointestinal:  Positive for abdominal pain. Negative for nausea and vomiting.   All other systems reviewed and are negative.       Remainder of systems reviewed, unless noted in HPI all others negative.    Physical Exam     BP (!) 164/81   Pulse 87   Temp 97.1  F (36.2  C) (Temporal)   Resp 20   SpO2 99%     Physical Exam  Vitals and nursing note reviewed.   Constitutional:       Appearance: She is obese.   HENT:      Head: Normocephalic.      Nose: Nose normal.   Cardiovascular:      Rate and Rhythm: Normal rate.   Pulmonary:      Effort: Pulmonary effort is normal.   Abdominal:      Tenderness: There is abdominal tenderness in the right upper quadrant. There is no guarding or rebound. Negative signs " include Catalan's sign and McBurney's sign.   Neurological:      Mental Status: She is alert. Mental status is at baseline.   Psychiatric:         Mood and Affect: Mood normal.             Labs & Imaging         Labs Ordered and Resulted from Time of ED Arrival to Time of ED Departure   COMPREHENSIVE METABOLIC PANEL - Abnormal       Result Value    Sodium 136      Potassium 4.3      Carbon Dioxide (CO2) 20 (*)     Anion Gap 13      Urea Nitrogen 12.8      Creatinine 0.79      GFR Estimate >90      Calcium 8.8      Chloride 103      Glucose 123 (*)     Alkaline Phosphatase 81      AST 21      ALT 12      Protein Total 7.3      Albumin 3.9      Bilirubin Total 0.5     CBC WITH PLATELETS AND DIFFERENTIAL - Abnormal    WBC Count 6.6      RBC Count 4.01      Hemoglobin 10.4 (*)     Hematocrit 32.1 (*)     MCV 80      MCH 25.9 (*)     MCHC 32.4      RDW 15.4 (*)     Platelet Count 294      % Neutrophils 42      % Lymphocytes 49      % Monocytes 6      % Eosinophils 2      % Basophils 1      % Immature Granulocytes 0      NRBCs per 100 WBC 0      Absolute Neutrophils 2.8      Absolute Lymphocytes 3.3      Absolute Monocytes 0.4      Absolute Eosinophils 0.2      Absolute Basophils 0.0      Absolute Immature Granulocytes 0.0      Absolute NRBCs 0.0     RBC AND PLATELET MORPHOLOGY - Abnormal    RBC Morphology Confirmed RBC Indices      Platelet Assessment        Value: Automated Count Confirmed. Platelet morphology is normal.    Acanthocytes Slight (*)     Elliptocytes Slight (*)    LIPASE - Normal    Lipase 18     ROUTINE UA WITH MICROSCOPIC REFLEX TO CULTURE         Results for orders placed or performed during the hospital encounter of 11/03/24   US Abdomen Limited    Impression    IMPRESSION:  1.  No acute abnormalities status post cholecystectomy.  2.  Mild hepatic steatosis.       XR Knee Bilateral 1/2 Views    Impression    IMPRESSION:   RIGHT KNEE: Moderate knee joint effusion. Normal joint alignment. Maintained joint  spacing without significant arthritic changes. No fracture or bone lesion.    LEFT KNEE: Moderate knee joint effusion. Normal joint alignment. Mild degenerative joint space narrowing and spurring in the medial compartment. Maintained joint spacing in the lateral and patellofemoral compartments. No fracture or bone lesion.   Comprehensive metabolic panel   Result Value Ref Range    Sodium 136 135 - 145 mmol/L    Potassium 4.3 3.4 - 5.3 mmol/L    Carbon Dioxide (CO2) 20 (L) 22 - 29 mmol/L    Anion Gap 13 7 - 15 mmol/L    Urea Nitrogen 12.8 6.0 - 20.0 mg/dL    Creatinine 0.79 0.51 - 0.95 mg/dL    GFR Estimate >90 >60 mL/min/1.73m2    Calcium 8.8 8.8 - 10.4 mg/dL    Chloride 103 98 - 107 mmol/L    Glucose 123 (H) 70 - 99 mg/dL    Alkaline Phosphatase 81 40 - 150 U/L    AST 21 0 - 45 U/L    ALT 12 0 - 50 U/L    Protein Total 7.3 6.4 - 8.3 g/dL    Albumin 3.9 3.5 - 5.2 g/dL    Bilirubin Total 0.5 <=1.2 mg/dL   Result Value Ref Range    Lipase 18 13 - 60 U/L   CBC with platelets and differential   Result Value Ref Range    WBC Count 6.6 4.0 - 11.0 10e3/uL    RBC Count 4.01 3.80 - 5.20 10e6/uL    Hemoglobin 10.4 (L) 11.7 - 15.7 g/dL    Hematocrit 32.1 (L) 35.0 - 47.0 %    MCV 80 78 - 100 fL    MCH 25.9 (L) 26.5 - 33.0 pg    MCHC 32.4 31.5 - 36.5 g/dL    RDW 15.4 (H) 10.0 - 15.0 %    Platelet Count 294 150 - 450 10e3/uL    % Neutrophils 42 %    % Lymphocytes 49 %    % Monocytes 6 %    % Eosinophils 2 %    % Basophils 1 %    % Immature Granulocytes 0 %    NRBCs per 100 WBC 0 <1 /100    Absolute Neutrophils 2.8 1.6 - 8.3 10e3/uL    Absolute Lymphocytes 3.3 0.8 - 5.3 10e3/uL    Absolute Monocytes 0.4 0.0 - 1.3 10e3/uL    Absolute Eosinophils 0.2 0.0 - 0.7 10e3/uL    Absolute Basophils 0.0 0.0 - 0.2 10e3/uL    Absolute Immature Granulocytes 0.0 <=0.4 10e3/uL    Absolute NRBCs 0.0 10e3/uL   RBC and Platelet Morphology   Result Value Ref Range    RBC Morphology Confirmed RBC Indices     Platelet Assessment  Automated Count  Confirmed. Platelet morphology is normal.     Automated Count Confirmed. Platelet morphology is normal.    Acanthocytes Slight (A) None Seen    Elliptocytes Slight (A) None Seen       Romel Duque MD  Emergency Medicine  [facility] EMERGENCY ROOM  [facility] [facility] [address]  [phone]  11/3/2024         Romel Duque MD  11/03/24 0922

## 2024-11-03 NOTE — ED TRIAGE NOTES
RUQ abd pain x2 months, stinging pain that woke her up this morning, worse with movement. Has had her gallbladder out previously, no other abd surgeries. No NVD, no blood in urine/vomit/stool.    No meds PTA; ABCs intact   Triage Assessment (Adult)       Row Name 11/03/24 0605          Triage Assessment    Airway WDL WDL        Respiratory WDL    Respiratory WDL WDL        Skin Circulation/Temperature WDL    Skin Circulation/Temperature WDL WDL        Cardiac WDL    Cardiac WDL WDL        Peripheral/Neurovascular WDL    Peripheral Neurovascular WDL WDL        Cognitive/Neuro/Behavioral WDL    Cognitive/Neuro/Behavioral WDL WDL